# Patient Record
Sex: FEMALE | Race: WHITE | NOT HISPANIC OR LATINO | Employment: FULL TIME | URBAN - METROPOLITAN AREA
[De-identification: names, ages, dates, MRNs, and addresses within clinical notes are randomized per-mention and may not be internally consistent; named-entity substitution may affect disease eponyms.]

---

## 2018-06-19 ENCOUNTER — HOSPITAL ENCOUNTER (EMERGENCY)
Facility: MEDICAL CENTER | Age: 33
End: 2018-06-19
Attending: EMERGENCY MEDICINE
Payer: COMMERCIAL

## 2018-06-19 ENCOUNTER — APPOINTMENT (OUTPATIENT)
Dept: RADIOLOGY | Facility: MEDICAL CENTER | Age: 33
End: 2018-06-19
Attending: EMERGENCY MEDICINE
Payer: COMMERCIAL

## 2018-06-19 VITALS
TEMPERATURE: 97.7 F | DIASTOLIC BLOOD PRESSURE: 83 MMHG | HEART RATE: 79 BPM | RESPIRATION RATE: 16 BRPM | OXYGEN SATURATION: 93 % | SYSTOLIC BLOOD PRESSURE: 124 MMHG | HEIGHT: 63 IN | BODY MASS INDEX: 28.32 KG/M2 | WEIGHT: 159.83 LBS
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
## 2018-06-19 DIAGNOSIS — F10.920 ALCOHOLIC INTOXICATION WITHOUT COMPLICATION (HCC): ICD-10-CM

## 2018-06-19 DIAGNOSIS — S43.102A AC SEPARATION, TYPE 3, LEFT, INITIAL ENCOUNTER: ICD-10-CM

## 2018-06-19 PROCEDURE — A9270 NON-COVERED ITEM OR SERVICE: HCPCS | Performed by: EMERGENCY MEDICINE

## 2018-06-19 PROCEDURE — 73030 X-RAY EXAM OF SHOULDER: CPT | Mod: LT

## 2018-06-19 PROCEDURE — 99284 EMERGENCY DEPT VISIT MOD MDM: CPT

## 2018-06-19 PROCEDURE — 700102 HCHG RX REV CODE 250 W/ 637 OVERRIDE(OP): Performed by: EMERGENCY MEDICINE

## 2018-06-19 RX ORDER — ACETAMINOPHEN 500 MG
1000 TABLET ORAL ONCE
Status: COMPLETED | OUTPATIENT
Start: 2018-06-19 | End: 2018-06-19

## 2018-06-19 RX ORDER — IBUPROFEN 600 MG/1
600 TABLET ORAL ONCE
Status: COMPLETED | OUTPATIENT
Start: 2018-06-19 | End: 2018-06-19

## 2018-06-19 RX ADMIN — ACETAMINOPHEN 1000 MG: 500 TABLET ORAL at 01:14

## 2018-06-19 RX ADMIN — IBUPROFEN 600 MG: 600 TABLET ORAL at 01:14
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
## 2018-06-19 NOTE — DISCHARGE INSTRUCTIONS
You were seen and evaluated in the Emergency Department at [facility] for:     Pain to your left shoulder    You had the following tests and studies:    Xray shows an AC separation, or slight dislocation of your clavicle.     You received the following medications:    Acetaminophen, ibuprofen.  Alternate between these 2 medicines every 4 hours as needed for pain.  Use the sling provided until you are cleared by orthopedics.  ----------------------------    Please make sure to follow up with:    [facility] for a recheck, if you do not heal well your pain gets worse she may need surgery in the future.    Return to the ER right away for any severe pain, numbness weakness or tingling, skin color changes, or any other new or worsening symptoms.    Good luck, we hope you get better soon!  ----------------------------    We always encourage patients to return IMMEDIATELY if they have:  Increased or changing pain, passing out, fevers over 100.4 (taken in your mouth or rectally) for more than 2 days, redness or swelling of skin or tissues, feeling like your heart is beating fast, chest pain that is new or worsening, trouble breathing, feeling like your throat is closing up and can not breath, inability to walk, weakness of any part of your body, new dizziness, severe bleeding that won't stop from any part of your body, if you can't eat or drink, or if you have any other concerns.   If you feel worse, please know that you can always return with any questions, concerns, worse symptoms, or you are feeling unsafe. We certainly cannot say for sure that we have ruled out every illness or dangerous disease, but we feel that at this specific time, your exam, tests, and vital signs like heart rate and blood pressure are safe for discharge.           Acromioclavicular Injuries  The acromioclavicular (AC) joint is the joint in the shoulder. There are many bands of tissue (ligaments) that surround the AC bones  and joints. These bands of tissue can tear, which can lead to sprains and separations. The bones of the AC joint can also break (fracture).   HOME CARE   · Put ice on the injured area.  ¨ Put ice in a plastic bag.  ¨ Place a towel between your skin and the bag.  ¨ Leave the ice on for 15-20 minutes, 03-04 times a day.  · Wear your sling as told by your doctor. Remove the sling before showering and bathing. Keep the shoulder in the same place as when the sling is on. Do not lift the arm.  · Gently tighten your figure-eight splint (if applied) every day. Tighten it enough to keep the shoulders held back. There should be room to place your finger between your body and the strap. Loosen the splint right away if you lose feeling (numbness) or have tingling in your hands.  · Only take medicine as told by your doctor.  · Keep all follow-up visits with your doctor.  GET HELP RIGHT AWAY IF:   · Your medicine does not help your pain.  · You have more puffiness (swelling) or your bruising gets worse rather than better.  · You were unable to follow up as told by your doctor.  · You have tingling or lose even more feeling in your arm, forearm, or hand.  · Your arm is cold or pale.  · You have more pain in the hand, forearm, or fingers.  MAKE SURE YOU:   · Understand these instructions.  · Will watch your condition.  · Will get help right away if you are not doing well or get worse.     This information is not intended to replace advice given to you by your health care provider. Make sure you discuss any questions you have with your health care provider.     Document Released: 06/07/2011 Document Revised: 03/11/2013 Document Reviewed: 06/07/2011  [employer] Interactive Patient Education ©2016 [employer]      Acromioclavicular Separation  Acromioclavicular separation is an injury to the small joint at the top of the shoulder (acromioclavicular joint or AC joint). The AC joint connects the outer tip of the collarbone (clavicle) to  the top of the shoulder blade (acromion). Two strong cords of tissue (acromioclavicular ligament and coracoclavicular ligament) stretch across the AC joint to keep it in place. An AC joint separation happens when one or both ligaments stretch or tear, causing the joint to separate.  There are six types of separation. The type of separation that you have depends on how much the ligaments are damaged and how far the joint has moved out of place. The less severe types of separation are most common.  What are the causes?  Common causes of this condition include:  · A hard, direct hit (blow) to the top of the shoulder.  · Falling on the shoulder.  · Falling on an outstretched arm.  What increases the risk?  This condition is more likely to develop in male athletes under age 35 who participate in sports that involve potential contact, such as:  · Football.  · Rugby.  · Hockey.  · Cycling.  · Martial arts.  What are the signs or symptoms?     The main symptom of this condition is shoulder pain. Pain may be mild or severe, depending on the type of separation. Other signs and symptoms in the shoulder may include:  · Swelling.  · Limited range of motion, especially when moving the arm across the body.  · Pain and tenderness when touching the top of the shoulder.  · Pain when putting weight on the shoulder, such as rolling on the shoulder while sleeping.  · A visible bump (deformity) over the joint.  · A clicking or popping sound when moving the shoulder.  How is this diagnosed?  This condition may be diagnosed based on:  · Your symptoms.  · Your medical history, including your history of recent injuries.  · A physical exam to check for deformity and limited range of motion.  · Imaging tests, such as:  ¨ X-rays.  ¨ MRI.  ¨ Ultrasound.  How is this treated?  Treatment for this condition may include:  · Resting the shoulder before gradually returning to normal activities.  · Icing the shoulder.  · NSAIDs to help reduce pain and  swelling.  · A sling to support your shoulder and keep it from moving.  · Physical therapy.  · Surgery. This is rare. Surgery may be needed for severe injuries that include breaks (fractures) in a bone, or injuries that do not get better with nonsurgical treatments. Surgery is followed by keeping your joint in place for a period of time (immobilization) and physical therapy.  Follow these instructions at home:  If you have a sling:  · Wear the sling as told by your health care provider. Remove it only as told by your health care provider.  · Reposition the sling if your fingers tingle, become numb, or turn cold and blue.  · Do not let your sling get wet if it is not waterproof. Ask your health care provider if you can remove the sling for bathing and showering.  · Keep the sling clean.  Managing pain, stiffness, and swelling  · If directed, apply ice to the injured area.  ¨ Put ice in a plastic bag.  ¨ Place a towel between your skin and the bag.  ¨ Leave the ice on for 20 minutes, 2-3 times a day.  · Move your fingers often to avoid stiffness and to lessen swelling.  Driving  · Do not drive or operate heavy machinery while taking prescription pain medicine.  · Ask your health care provider when it is safe for you to drive.  Activity  · Rest and return to your normal activities as told by your health care provider. Ask your health care provider what activities are safe for you.  · Do exercises as told by your health care provider.  General instructions  · Do not use any tobacco products, such as cigarettes, chewing tobacco, and e-cigarettes. Tobacco can delay healing. If you need help quitting, ask your health care provider.  · Take over-the-counter and prescription medicines only as told by your health care provider.  · Keep all follow-up visits as told by your health care provider. This is important.  How is this prevented?  · Make sure to use equipment that fits you.  · Wear shoulder padding during contact  sports.  · Be safe and responsible while being active to avoid falls.  Contact a health care provider if:  · Your pain and stiffness do not improve after 2 weeks.  This information is not intended to replace advice given to you by your health care provider. Make sure you discuss any questions you have with your health care provider.  Document Released: 12/18/2006 Document Revised: 08/24/2017 Document Reviewed: 11/19/2016  [employer] Interactive Patient Education © 2017 [employer]
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
## 2020-08-26 ENCOUNTER — APPOINTMENT (OUTPATIENT)
Dept: RADIOLOGY | Facility: MEDICAL CENTER | Age: 35
End: 2020-08-26
Attending: EMERGENCY MEDICINE
Payer: OTHER MISCELLANEOUS

## 2020-08-26 ENCOUNTER — HOSPITAL ENCOUNTER (EMERGENCY)
Facility: MEDICAL CENTER | Age: 35
End: 2020-08-26
Attending: EMERGENCY MEDICINE
Payer: OTHER MISCELLANEOUS

## 2020-08-26 VITALS
OXYGEN SATURATION: 96 % | BODY MASS INDEX: 26.68 KG/M2 | RESPIRATION RATE: 16 BRPM | HEIGHT: 62 IN | TEMPERATURE: 97.6 F | SYSTOLIC BLOOD PRESSURE: 147 MMHG | HEART RATE: 114 BPM | DIASTOLIC BLOOD PRESSURE: 83 MMHG | WEIGHT: 145 LBS
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
## 2020-08-26 DIAGNOSIS — S50.12XA CONTUSION OF LEFT FOREARM, INITIAL ENCOUNTER: ICD-10-CM

## 2020-08-26 LAB — POC BREATHALIZER: 0.05 PERCENT (ref 0–0.01)

## 2020-08-26 PROCEDURE — 73090 X-RAY EXAM OF FOREARM: CPT | Mod: LT

## 2020-08-26 PROCEDURE — 307740 HCHG GREEN TRAUMA TEAM SERVICES

## 2020-08-26 PROCEDURE — 73080 X-RAY EXAM OF ELBOW: CPT | Mod: LT

## 2020-08-26 PROCEDURE — 99284 EMERGENCY DEPT VISIT MOD MDM: CPT

## 2020-08-26 PROCEDURE — 302970 POC BREATHALIZER: Performed by: EMERGENCY MEDICINE
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
